# Patient Record
Sex: FEMALE | Race: WHITE | NOT HISPANIC OR LATINO | Employment: FULL TIME | URBAN - METROPOLITAN AREA
[De-identification: names, ages, dates, MRNs, and addresses within clinical notes are randomized per-mention and may not be internally consistent; named-entity substitution may affect disease eponyms.]

---

## 2024-03-22 ENCOUNTER — OFFICE VISIT (OUTPATIENT)
Dept: URGENT CARE | Facility: CLINIC | Age: 39
End: 2024-03-22
Payer: COMMERCIAL

## 2024-03-22 ENCOUNTER — APPOINTMENT (OUTPATIENT)
Dept: RADIOLOGY | Facility: CLINIC | Age: 39
End: 2024-03-22
Payer: COMMERCIAL

## 2024-03-22 VITALS
BODY MASS INDEX: 32.37 KG/M2 | OXYGEN SATURATION: 99 % | TEMPERATURE: 97.5 F | HEART RATE: 75 BPM | DIASTOLIC BLOOD PRESSURE: 67 MMHG | RESPIRATION RATE: 12 BRPM | WEIGHT: 201.4 LBS | SYSTOLIC BLOOD PRESSURE: 122 MMHG | HEIGHT: 66 IN

## 2024-03-22 DIAGNOSIS — S83.242A ACUTE MEDIAL MENISCUS TEAR OF LEFT KNEE, INITIAL ENCOUNTER: Primary | ICD-10-CM

## 2024-03-22 DIAGNOSIS — S89.92XA INJURY OF LEFT KNEE, INITIAL ENCOUNTER: ICD-10-CM

## 2024-03-22 PROCEDURE — 99213 OFFICE O/P EST LOW 20 MIN: CPT | Performed by: PHYSICIAN ASSISTANT

## 2024-03-22 PROCEDURE — 73564 X-RAY EXAM KNEE 4 OR MORE: CPT

## 2024-03-22 RX ORDER — OMEPRAZOLE 40 MG/1
40 CAPSULE, DELAYED RELEASE ORAL DAILY
COMMUNITY
Start: 2024-01-15 | End: 2024-03-26 | Stop reason: ALTCHOICE

## 2024-03-22 RX ORDER — FLUTICASONE PROPIONATE 50 MCG
2 SPRAY, SUSPENSION (ML) NASAL DAILY
COMMUNITY
Start: 2024-01-15

## 2024-03-22 RX ORDER — CETIRIZINE HYDROCHLORIDE 10 MG/1
10 TABLET ORAL DAILY
COMMUNITY
Start: 2024-01-15 | End: 2024-03-26 | Stop reason: ALTCHOICE

## 2024-03-22 RX ORDER — MULTIVITAMIN
1 CAPSULE ORAL DAILY
COMMUNITY

## 2024-03-22 RX ORDER — OSELTAMIVIR PHOSPHATE 75 MG/1
CAPSULE ORAL
COMMUNITY
Start: 2024-01-15 | End: 2024-03-26 | Stop reason: ALTCHOICE

## 2024-03-22 NOTE — PATIENT INSTRUCTIONS
Follow-up with orthopedics  Motrin and or Tylenol as needed for pain  Wear knee compression sleeve  Follow up with PCP in 3-5 days.  Proceed to  ER if symptoms worsen.    If tests have been performed at [facility], our office will contact you with results if changes need to be made to the care plan discussed with you at the visit.  You can review your full results on [facility] My[facility].    Meniscus Tear   AMBULATORY CARE:   A meniscus tear  is a tear in the cartilage of your knee. The meniscus is a piece of cartilage (strong tissue) between your thighbone and shinbone. The meniscus helps to cushion your knee joint and keep it stable.  Call your local emergency number (911 in the [place]) if:   Your arm or leg feels warm, tender, and painful. It may look swollen and red.      Seek care immediately if:   You cannot move your knee at all.       Call your doctor if:   Your symptoms do not improve with treatment.    You have questions or concerns about your condition or care.    Common symptoms include the following:   A pop or tear when the injury happens    Pain and swelling    Tenderness    Stiffness    Popping, catching, or locking of your knee    Not being able to extend your knee fully    Treatment for a meniscus tear  depends on the type of tear you have. Some types of meniscus tears can heal on their own. You may need any of the following:  NSAIDs , such as ibuprofen, help decrease swelling, pain, and fever. This medicine is available with or without a doctor's order. NSAIDs can cause stomach bleeding or kidney problems in certain people. If you take blood thinner medicine, always ask if NSAIDs are safe for you. Always read the medicine label and follow directions. Do not give these medicines to children younger than 6 months without direction from a healthcare provider.     Rest  your knee. Avoid activities that make the swelling or pain worse. You may need to avoid putting weight on your leg while you have pain. Your  healthcare provider may recommend that you use crutches.    Apply ice  on your knee for 15 to 20 minutes every hour or as directed. Use an ice pack, or put crushed ice in a plastic bag. Cover it with a towel. Ice helps prevent tissue damage and decreases swelling and pain.    Compress  your knee with an elastic bandage, air cast, medical boot, or splint to reduce swelling. Ask your healthcare provider which compression device to use, and how tight it should be.    Elevate  your knee above the level of your heart as often as you can. This will help decrease swelling and pain. Prop your knee on pillows or blankets to keep it elevated comfortably.     Surgery  may be needed if your symptoms do not improve. Your healthcare provider may trim away or repair damaged tissue.    Follow up with your doctor as directed:  Write down your questions so you remember to ask them during your visits.   © Copyright [employer] 2023 Information is for End User's use only and may not be sold, redistributed or otherwise used for commercial purposes.  The above information is an  only. It is not intended as medical advice for individual conditions or treatments. Talk to your doctor, nurse or pharmacist before following any medical regimen to see if it is safe and effective for you.

## 2024-03-22 NOTE — PROGRESS NOTES
[facility]        NAME: Vivi Sethi is a 39 y.o. female  : 1985    MRN: [id number]  DATE: 2024  TIME: 1:58 PM    Assessment and Plan   Acute medial meniscus tear of left knee, initial encounter [S83.242A]  1. Acute medial meniscus tear of left knee, initial encounter  XR knee 4+ vw left injury    Ambulatory Referral to Orthopedic Surgery            Patient Instructions     Follow-up with orthopedics  Motrin and or Tylenol as needed for pain  Wear knee compression sleeve  Follow up with PCP in 3-5 days.  Proceed to  ER if symptoms worsen.    If tests have been performed at [facility], our office will contact you with results if changes need to be made to the care plan discussed with you at the visit.  You can review your full results on [facility].    Chief Complaint     Chief Complaint   Patient presents with    Knee Injury     Left knee pain after twisting knee and hearing a pop yesterday. Pain worsened this am.         History of Present Illness       39-year-old female presents with left knee injury.  Patient states she was at soccer practice and went to go after a ball and pivoted on her left knee to get the ball and felt a pop in pain sensation in her left medial knee area.  Since then knees become more painful and feels swollen today.  Denies any numbness or tingling in leg.  No previous injuries of the knee.    Knee Pain   The incident occurred 12 to 24 hours ago. [place] The injury mechanism was a twisting injury. The pain is present in the left knee. The quality of the pain is described as aching. The pain is mild. The pain has been Intermittent since onset. Associated symptoms include a loss of motion. Pertinent negatives include no inability to bear weight, loss of sensation, muscle weakness, numbness or tingling. She reports no foreign bodies present. The symptoms are aggravated by movement and palpation. She has tried rest, ice and  "immobilization for the symptoms. The treatment provided no relief.       Review of Systems   Review of Systems   Constitutional: Negative.    Respiratory: Negative.     Cardiovascular: Negative.    Gastrointestinal: Negative.    Musculoskeletal:  Positive for arthralgias and joint swelling.   Skin: Negative.    Neurological: Negative.  Negative for tingling and numbness.         Current Medications       Current Outpatient Medications:     Multiple Vitamin (multivitamin) capsule, Take 1 capsule by mouth daily, Disp: , Rfl:     Current Allergies     Allergies as of 03/22/2024    (No Known Allergies)            The following portions of the patient's history were reviewed and updated as appropriate: allergies, current medications, past family history, past medical history, past social history, past surgical history and problem list.     History reviewed. No pertinent past medical history.    Past Surgical History:   Procedure Laterality Date    OTHER SURGICAL HISTORY      egg retreival       Family History   Problem Relation Age of Onset    Cancer Mother     Hypertension Father     Hyperlipidemia Father          Medications have been verified.        Objective   /67   Pulse 75   Temp 97.5 °F (36.4 °C)   Resp 12   Ht 5' 6\" (1.676 m)   Wt 91.4 kg (201 lb 6.4 oz)   LMP 03/11/2024   SpO2 99%   BMI 32.51 kg/m²   Patient's last menstrual period was 03/11/2024.       Physical Exam     Physical Exam  Vitals and nursing note reviewed.   Constitutional:       General: She is not in acute distress.     Appearance: She is well-developed.   HENT:      Head: Normocephalic and atraumatic.      Right Ear: External ear normal.      Left Ear: External ear normal.      Nose: Nose normal.      Mouth/Throat:      Mouth: Mucous membranes are moist.   Eyes:      General:         Right eye: No discharge.         Left eye: No discharge.      Conjunctiva/sclera: Conjunctivae normal.   Pulmonary:      Effort: Pulmonary effort is " normal. No respiratory distress.   Musculoskeletal:      Cervical back: Normal range of motion and neck supple.      Left knee: Swelling and bony tenderness present. No deformity, effusion, erythema, ecchymosis, lacerations or crepitus. Decreased range of motion. Tenderness present over the medial joint line. No LCL laxity, MCL laxity, ACL laxity or PCL laxity.Abnormal meniscus. Normal alignment and normal patellar mobility.      Instability Tests: Anterior drawer test negative. Posterior drawer test negative. Anterior Lachman test negative. Medial Vasyl test positive. Lateral Vasyl test negative.   Skin:     General: Skin is warm and dry.   Neurological:      Mental Status: She is alert and oriented to person, place, and time.         Left knee x-ray: No fractures or abnormalities noted.  Pending radiologist interpretation.  Images reviewed independently myself.      MDM: Most likely meniscal tear to the medial aspect.  Recommended neoprene sleeve for comfort and support.  Will have follow-up with orthopedics

## 2024-03-25 ENCOUNTER — OFFICE VISIT (OUTPATIENT)
Dept: OBGYN CLINIC | Facility: CLINIC | Age: 39
End: 2024-03-25

## 2024-03-25 VITALS
HEART RATE: 74 BPM | BODY MASS INDEX: 33.07 KG/M2 | DIASTOLIC BLOOD PRESSURE: 70 MMHG | WEIGHT: 205.8 LBS | TEMPERATURE: 97.9 F | HEIGHT: 66 IN | SYSTOLIC BLOOD PRESSURE: 110 MMHG

## 2024-03-25 DIAGNOSIS — M25.562 ACUTE PAIN OF LEFT KNEE: Primary | ICD-10-CM

## 2024-03-25 PROCEDURE — 99204 OFFICE O/P NEW MOD 45 MIN: CPT | Performed by: ORTHOPAEDIC SURGERY

## 2024-03-25 NOTE — PROGRESS NOTES
ASSESSMENT/PLAN:    Diagnoses and all orders for this visit:    Acute pain of left knee  -     Ambulatory Referral to Orthopedic Surgery        Plan: Treatment was discussed.  At this time, she would like to see if symptoms continue to improve spontaneously.  I did discuss physical therapy but she does not feel the need to attend therapy at this time.  I will see her in 7 to 10 days for reevaluation.  If there has not been a further improvement in symptoms, advanced diagnostic imaging may be warranted.  However, she was reassured that today's evaluation was not strongly indicative of any significant intra-articular pathology.    Return for 7 to 10 days.      _____________________________________________________  CHIEF COMPLAINT:  Chief Complaint   Patient presents with    Left Knee - Pain         SUBJECTIVE:  Vivi Sethi is a 39 y.o. year old female who presents for evaluation of her left knee.  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  She describes a valgus/external rotation injury to the left knee.  She felt a popping sensation, her knee was slightly flexed and she was able to extend the knee and then continue to assist with coaching although she did tend to take it easy.  She denies any swelling.  By the following morning, with persistent pain and slightly worsening pain, she elected to be seen and was seen at the [facility] in [place].  X-rays were obtained and she was referred for orthopedic evaluation and treatment.  She notes some improvement over the last few days but continues to complain of primarily medial pain.  She continues to deny any significant swelling.  She has noted improvement in range of motion but does feel as if the knee is tight especially when she tries to flex the knee.  Denies history of prior injuries.  She has noted a sensation of instability when she initially places weight on the left lower extremity.  She states that she can bear weight on the left lower extremity without pain but if she  "tries to move forward with weightbearing on the left lower extremity she does experience some mild pain.    PAST MEDICAL HISTORY:  History reviewed. No pertinent past medical history.    PAST SURGICAL HISTORY:  Past Surgical History:   Procedure Laterality Date    OTHER SURGICAL HISTORY      egg retreival       FAMILY HISTORY:  Family History   Problem Relation Age of Onset    Cancer Mother     Hypertension Father     Hyperlipidemia Father        SOCIAL HISTORY:  Social History     Tobacco Use    Smoking status: Never    Smokeless tobacco: Never   Vaping Use    Vaping status: Never Used   Substance Use Topics    Alcohol use: Not Currently    Drug use: Never       MEDICATIONS:    Current Outpatient Medications:     Multiple Vitamin (multivitamin) capsule, Take 1 capsule by mouth daily, Disp: , Rfl:     cetirizine (ZyrTEC Allergy) 10 mg tablet, Take 10 mg by mouth daily, Disp: , Rfl:     fluticasone (FLONASE) 50 mcg/act nasal spray, 2 sprays into each nostril daily, Disp: , Rfl:     omeprazole (PriLOSEC) 40 MG capsule, Take 40 mg by mouth daily, Disp: , Rfl:     oseltamivir (TAMIFLU) 75 mg capsule, , Disp: , Rfl:     ALLERGIES:  No Known Allergies    Review of systems:   Constitutional: Negative for fatigue, fever or loss of apetite.   HENT: Negative.    Respiratory: Negative for shortness of breath, dyspnea.    Cardiovascular: Negative for chest pain/tightness.   Gastrointestinal: Negative for abdominal pain, N/V.   Endocrine: Negative for cold/heat intolerance, unexplained weight loss/gain.   Genitourinary: Negative for flank pain, dysuria, hematuria.   Musculoskeletal: Positive as in the HPI  Skin: Negative for rash.    Neurological: Negative  Psychiatric/Behavioral: Negative for agitation.  _____________________________________________________  PHYSICAL EXAMINATION:    Blood pressure 110/70, pulse 74, temperature 97.9 °F (36.6 °C), temperature source Temporal, height 5' 6\" (1.676 m), weight 93.4 kg (205 lb 12.8 " oz), last menstrual period 03/11/2024.    General: well developed and well nourished, alert, oriented times 3, and appears comfortable  Psychiatric: Normal  HEENT: Benign  Cardiovascular: Regular    Pulmonary: No wheezing or stridor  Abdomen: Soft, Nontender  Skin: No masses, erythema, lacerations, fluctation, ulcerations  Neurovascular: Motor and sensory exams are intact and pulses palpable.    MUSCULOSKELETAL EXAMINATION:    The left knee exam demonstrates minimal, if any, effusion.  There is an ecchymoses.  There is no deformity.  She has full extension of the knee and can flex to about 110 degrees with complaints of tightness.  I was able to obtain an additional 10 degrees or so of flexion without significant pain, once again with complaints of tightness.  Collateral and cruciate ligaments are grossly stable.  She does complain of some tenderness with palpation of the lateral joint line and femoral condyle.  She denied tenderness over the medial joint line or femoral condyle.  The tibial plateau was nontender bilaterally.  The patella, patella tendon and tibial tubercle were nontender.  She has negative Vasyl's.  Apley's compression and distraction test are negative.  Thessaly test elicited no complaints of pain although she did feel that the knee was tight.  The remainder of the lower extremity examination bilaterally is benign.      _____________________________________________________  STUDIES REVIEWED:  X-rays of the knee demonstrated no evidence of bony injury.    The x-ray report was reviewed.    The [facility] note was reviewed.        Alfredo Dye

## 2024-04-01 ENCOUNTER — OFFICE VISIT (OUTPATIENT)
Dept: OBGYN CLINIC | Facility: CLINIC | Age: 39
End: 2024-04-01
Payer: COMMERCIAL

## 2024-04-01 VITALS
HEART RATE: 93 BPM | OXYGEN SATURATION: 98 % | DIASTOLIC BLOOD PRESSURE: 80 MMHG | BODY MASS INDEX: 33.43 KG/M2 | SYSTOLIC BLOOD PRESSURE: 120 MMHG | WEIGHT: 208 LBS | TEMPERATURE: 97.6 F | HEIGHT: 66 IN

## 2024-04-01 DIAGNOSIS — M25.562 ACUTE PAIN OF LEFT KNEE: Primary | ICD-10-CM

## 2024-04-01 DIAGNOSIS — M25.462 EFFUSION OF LEFT KNEE: ICD-10-CM

## 2024-04-01 PROCEDURE — 99213 OFFICE O/P EST LOW 20 MIN: CPT | Performed by: ORTHOPAEDIC SURGERY

## 2024-04-01 NOTE — PROGRESS NOTES
ASSESSMENT/PLAN:    Diagnoses and all orders for this visit:    Acute pain of left knee  -     MRI knee left  wo contrast; Future  -     Ambulatory Referral to Physical Therapy; Future    Effusion of left knee    Due to the patient's continued discomfort and effusion we will proceed with MRI evaluation of the left knee and see the patient back in 1 to 2 weeks with MRI results.  May start in formal physical therapy.  He may elect to use her home brace if she feels this is some benefit.  She should continue to use ice and elevation for effusion and swelling control and over-the-counter pain medicine if needed.  Activity as tolerated.  No work noted per patient.    Return 1 to 2 weeks with MRI results, for Recheck.  _____________________________________________________  CHIEF COMPLAINT:  Chief Complaint   Patient presents with    Left Knee - Follow-up    Follow-up     SUBJECTIVE:  Vivi Sethi is a 39 y.o. 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  Has not started any therapy.  Notes the knee feels slightly better still has discomfort, swelling, feelings of instability about the left knee.  She denies gross locking.  Notes that she moves in a slightly gingerly fashion is now able to slowly alternate legs when ascending and descending stairs.    PAST MEDICAL HISTORY:  History reviewed. No pertinent past medical history.    PAST SURGICAL HISTORY:  Past Surgical History:   Procedure Laterality Date    OTHER SURGICAL HISTORY      egg retreival     FAMILY HISTORY:  Family History   Problem Relation Age of Onset    Cancer Mother     Hypertension Father     Hyperlipidemia Father      SOCIAL HISTORY:  Social History     Tobacco Use    Smoking status: Never    Smokeless tobacco: Never   Vaping Use    Vaping status: Never Used   Substance Use Topics    Alcohol use: Not Currently    Drug use: Never     MEDICATIONS:    Current Outpatient Medications:      Multiple Vitamin (multivitamin) capsule, Take 1 capsule by mouth daily, Disp: , Rfl:     fluticasone (FLONASE) 50 mcg/act nasal spray, 2 sprays into each nostril daily (Patient not taking: Reported on 4/1/2024), Disp: , Rfl:     ALLERGIES:  No Known Allergies    REVIEW OF SYSTEMS:  Pertinent items are noted in HPI.  A comprehensive review of systems was negative.  _____________________________________________________  PHYSICAL EXAMINATION:  General: well developed and well nourished, alert, and oriented times 3  Psychiatric: Normal  HEENT:  Normocephalic, atraumatic  Cardiovascular:  Regular  Pulmonary: No wheezing or stridor  Skin: No masses, erthema, lacerations, fluctation, ulcerations  Neurovascular: Grossly intact  MUSCULOSKELETAL EXAMINATION:    Left knee presents with trace to moderate effusion.  No erythema no warmth no ecchymosis.  She is unable to get the knee fully extended while supine on the table.  She has medial joint line tenderness is not present on the contralateral leg.  No lateral joint line tenderness.  Range of motion is 5 to 120 3's.  It shows apprehension with Vasyl's testing notes discomfort but no clicking.  This is slightly limited due to the fact that she cannot fully flex the knee.  No gross ligamentous laxity.  Passively test elicits no discomfort but patient has apprehension is noted to not taped as far she does with the contralateral knee.  Touch sensation is intact.  _____________________________________________________  STUDIES REVIEWED:  No new studies to review    PROCEDURES PERFORMED:  None today    Jonathan Sandhu PA-C

## 2024-04-01 NOTE — PATIENT INSTRUCTIONS
Due to the patient's continued discomfort and effusion we will proceed with MRI evaluation of the left knee and see the patient back in 1 to 2 weeks with MRI results.  May start in formal physical therapy.  He may elect to use her home brace if she feels this is some benefit.  She should continue to use ice and elevation for effusion and swelling control and over-the-counter pain medicine if needed.  Activity as tolerated.  No work noted per patient.

## 2024-04-10 ENCOUNTER — HOSPITAL ENCOUNTER (OUTPATIENT)
Dept: MRI IMAGING | Facility: HOSPITAL | Age: 39
Discharge: HOME | End: 2024-04-10
Payer: COMMERCIAL

## 2024-04-10 DIAGNOSIS — M25.562 ACUTE PAIN OF LEFT KNEE: ICD-10-CM

## 2024-04-10 PROCEDURE — 73721 MRI JNT OF LWR EXTRE W/O DYE: CPT

## 2024-04-22 ENCOUNTER — OFFICE VISIT (OUTPATIENT)
Dept: OBGYN CLINIC | Facility: CLINIC | Age: 39
End: 2024-04-22
Payer: COMMERCIAL

## 2024-04-22 VITALS
HEART RATE: 82 BPM | WEIGHT: 208 LBS | DIASTOLIC BLOOD PRESSURE: 83 MMHG | TEMPERATURE: 97.9 F | SYSTOLIC BLOOD PRESSURE: 123 MMHG | HEIGHT: 66 IN | BODY MASS INDEX: 33.43 KG/M2

## 2024-04-22 DIAGNOSIS — M25.562 ACUTE PAIN OF LEFT KNEE: Primary | ICD-10-CM

## 2024-04-22 DIAGNOSIS — S83.512D TEARS OF MENISCUS AND ACL OF LEFT KNEE, SUBSEQUENT ENCOUNTER: ICD-10-CM

## 2024-04-22 DIAGNOSIS — S83.207D TEARS OF MENISCUS AND ACL OF LEFT KNEE, SUBSEQUENT ENCOUNTER: ICD-10-CM

## 2024-04-22 PROCEDURE — 99214 OFFICE O/P EST MOD 30 MIN: CPT | Performed by: ORTHOPAEDIC SURGERY

## 2024-04-22 NOTE — PROGRESS NOTES
ASSESSMENT/PLAN:    Diagnoses and all orders for this visit:    Acute pain of left knee    Tears of meniscus and ACL of left knee, subsequent encounter    Will get the patient started in physical therapy for ACL rehab and to work on gaining her range of motion of the knee.  It was placed in epic for an ACL brace.  Patient will follow-up in the orthopedic office in 6 weeks for repeat evaluation.  Activity as tolerated.  Ice and elevate for pain or swelling.  Over-the-counter pain meds as needed.    The option of surgery was discussed.  She would prefer to avoid surgery if at all possible and it is not essential.  She does understand that the meniscal tear will not heal.  If she continues to demonstrate findings which are consistent with pain from a medial meniscal tear but does not demonstrate instability after therapy, arthroscopic surgery for the meniscal tear may be a reasonable option.  If she does continue to demonstrate instability despite physical therapy then ACL reconstruction may be necessary.    Return in about 6 weeks (around 6/3/2024) for Recheck.  _____________________________________________________  CHIEF COMPLAINT:  Chief Complaint   Patient presents with    Left Knee - Follow-up    Follow-up     SUBJECTIVE:  Vivi Sethi is a 39 y.o. year old female who presents for follow up of left knee pain and swelling and review of MRI results.  Patient reports remote history of injury in 2003 without any x-rays taken but she was told that she possibly strained her ACL at that point in time.  She did not experience any significant stability or laxity over the past 21 years.  Since her more recent injury that occurred on an abrupt turn and pushed to the right she did hear a pop he notes that wearing a neoprene brace does give her support.  She has not experienced any significant laxity however she notes that she is walking more gingerly and more carefully.  She reports she is she can still not get full extension.   Again she denies any significant major swelling.  Does have some pain in the knee that continues.  He is still being able to  soccer but wearing her brace and it is more subdued and cautious with her movement.  Is unsure whether she would want any surgery as now as she has talked to friends that did not have surgery and 1 regrets not having it and other friends that did have surgical reconstruction.    PAST MEDICAL HISTORY:  History reviewed. No pertinent past medical history.    PAST SURGICAL HISTORY:  Past Surgical History:   Procedure Laterality Date    OTHER SURGICAL HISTORY      egg retreival     FAMILY HISTORY:  Family History   Problem Relation Age of Onset    Cancer Mother     Hypertension Father     Hyperlipidemia Father      SOCIAL HISTORY:  Social History     Tobacco Use    Smoking status: Never    Smokeless tobacco: Never   Vaping Use    Vaping status: Never Used   Substance Use Topics    Alcohol use: Not Currently    Drug use: Never     MEDICATIONS:    Current Outpatient Medications:     Multiple Vitamin (multivitamin) capsule, Take 1 capsule by mouth daily, Disp: , Rfl:     fluticasone (FLONASE) 50 mcg/act nasal spray, 2 sprays into each nostril daily (Patient not taking: Reported on 4/1/2024), Disp: , Rfl:     ALLERGIES:  No Known Allergies    REVIEW OF SYSTEMS:  Pertinent items are noted in HPI.  A comprehensive review of systems was negative.  _____________________________________________________  PHYSICAL EXAMINATION:  General: well developed and well nourished, alert, oriented times 3, and appears comfortable  Psychiatric: Normal  HEENT:  Normocephalic, atraumatic  Cardiovascular:  Regular  Pulmonary: No wheezing or stridor  Skin: No masses, erthema, lacerations, fluctation, ulcerations  Neurovascular: Grossly intact.  MUSCULOSKELETAL EXAMINATION:    Left knee is without any gross intra-articular effusion today.  The seated position anterior drawer very similar translation to the  contralateral side with a slightly firm endpoint but these are very close.  He has a negative posterior drawer.  In the supine position lacks the last 5 degrees of extension and has pain with passive extension past this point but I was able to get her within just a degree or 2 of full extension.  Her flexion is to approximately 115 degrees.  After reassurance, anterior drawer and Lachman testing demonstrates 1-2+ a laxity of the left knee compared to the contralateral right side.  She denies any tenderness to palpation of the medial joint line and had a negative Vasyl's.  She has no tenderness over the femoral condyles or tibial plateau.  Light touch sensation is grossly intact.  Patella tracks midline.  She does have some medial joint line tenderness but no lateral joint line tenderness.  _____________________________________________________  STUDIES REVIEWED:  Left knee MRI dated 4/10/2024 notes  midsubstance tear of the ACL, medial meniscus posterior horn tear and bone contusion pattern consistent with a pivot-shift injury mechanism.    The MRI report was reviewed.    Alfredo Dye

## 2024-04-22 NOTE — PATIENT INSTRUCTIONS
Will get the patient started in physical therapy for ACL rehab and to work on gaining her range of motion of the knee.  It was placed in epic for an ACL brace.  Patient will follow-up in the orthopedic office in 6 weeks for repeat evaluation.  Activity as tolerated.  Ice and elevate for pain or swelling.  Over-the-counter pain meds as needed.

## 2024-05-08 ENCOUNTER — DOCUMENTATION (OUTPATIENT)
Dept: OBGYN CLINIC | Facility: CLINIC | Age: 39
End: 2024-05-08

## 2024-05-13 ENCOUNTER — DOCUMENTATION (OUTPATIENT)
Dept: OBGYN CLINIC | Facility: CLINIC | Age: 39
End: 2024-05-13

## 2024-05-13 NOTE — PROGRESS NOTES
5/13 I reached pt.  She has physical therapy twice a week and is doing well.  She will wait for her follow up with Dr. Dye regarding the ACL brace.

## 2024-06-03 ENCOUNTER — OFFICE VISIT (OUTPATIENT)
Dept: OBGYN CLINIC | Facility: CLINIC | Age: 39
End: 2024-06-03
Payer: COMMERCIAL

## 2024-06-03 VITALS
DIASTOLIC BLOOD PRESSURE: 80 MMHG | WEIGHT: 208 LBS | OXYGEN SATURATION: 98 % | SYSTOLIC BLOOD PRESSURE: 130 MMHG | BODY MASS INDEX: 33.43 KG/M2 | HEIGHT: 66 IN | TEMPERATURE: 97.7 F | HEART RATE: 85 BPM

## 2024-06-03 DIAGNOSIS — S83.207D TEARS OF MENISCUS AND ACL OF LEFT KNEE, SUBSEQUENT ENCOUNTER: Primary | ICD-10-CM

## 2024-06-03 DIAGNOSIS — S83.512D TEARS OF MENISCUS AND ACL OF LEFT KNEE, SUBSEQUENT ENCOUNTER: Primary | ICD-10-CM

## 2024-06-03 PROCEDURE — 99213 OFFICE O/P EST LOW 20 MIN: CPT | Performed by: ORTHOPAEDIC SURGERY

## 2024-06-03 NOTE — PROGRESS NOTES
ASSESSMENT/PLAN:    Diagnoses and all orders for this visit:    Tears of meniscus and ACL of left knee, subsequent encounter        Plan: Treatment was again discussed.  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  At this time, she has experienced enough relief that she would prefer not to proceed with surgical treatment.  She will continue therapy another few weeks.  She has not yet obtained her functional brace as physical therapy thought it best to hold off on obtaining the functional brace.  I would recommend she get the functional brace.  I will see her in 4 to 6 weeks for reevaluation.  She is to contact the office if questions or concerns arise in the interim.    Return for 4 to 6 weeks.      _____________________________________________________  CHIEF COMPLAINT:  Chief Complaint   Patient presents with    Follow-up         SUBJECTIVE:  Vivi Sethi is a 39 y.o. year old female who presents for follow up of her left knee symptoms.  She has noted further improvement with physical therapy.  Her primary difficulty is obtaining endrange extension and flexion.  She does note a feeling of weakness in the leg noting the quad tends to fasciculate with leg extension exercises.  She denies instability.  She does experience some medial knee pain when trying to sit in a figure-of-four position.  She notes a popping sensation which occurs over the anteromedial aspect of her knee on an occasional basis but does not necessarily cause pain.      PAST MEDICAL HISTORY:  History reviewed. No pertinent past medical history.    PAST SURGICAL HISTORY:  Past Surgical History:   Procedure Laterality Date    OTHER SURGICAL HISTORY      egg retreival       FAMILY HISTORY:  Family History   Problem Relation Age of Onset    Cancer Mother     Hypertension  Father     Hyperlipidemia Father        SOCIAL HISTORY:  Social History     Tobacco Use    Smoking status: Never    Smokeless tobacco: Never   Vaping Use    Vaping status: Never Used   Substance Use Topics    Alcohol use: Not Currently    Drug use: Never       MEDICATIONS:    Current Outpatient Medications:     Multiple Vitamin (multivitamin) capsule, Take 1 capsule by mouth daily, Disp: , Rfl:     fluticasone (FLONASE) 50 mcg/act nasal spray, 2 sprays into each nostril daily (Patient not taking: Reported on 4/1/2024), Disp: , Rfl:     ALLERGIES:  No Known Allergies    REVIEW OF SYSTEMS:  Pertinent items are noted in HPI.  A comprehensive review of systems was negative.      _____________________________________________________  PHYSICAL EXAMINATION:  General: well developed and well nourished, alert, and appears comfortable  Psychiatric: Normal  HEENT:  Normocephalic, atraumatic  Cardiovascular:  Regular  Pulmonary: No wheezing or stridor  Skin: No masses, erthema, lacerations, fluctation, ulcerations  Neurovascular: Motor and sensory exams are intact and pulses are palpable.    MUSCULOSKELETAL EXAMINATION:    The left knee exam demonstrates a lack of a couple degrees of terminal extension.  She has flexion to about 120 degrees, perhaps slightly more.  She notes mild tightness with endrange flexion and extension.  She has no significant laxity with anterior draw.  And Lachman testing there is slight laxity of the left compared to the contralateral right but no complaints with the Lachman test and a solid endpoint.  Vasyl's test is negative.  She has no joint line tenderness.  Apley's compression and distraction are negative.    _____________________________________________________  STUDIES REVIEWED:  Physical therapy notes were reviewed.            Alfredo Dye

## 2024-07-15 ENCOUNTER — OFFICE VISIT (OUTPATIENT)
Dept: OBGYN CLINIC | Facility: CLINIC | Age: 39
End: 2024-07-15
Payer: COMMERCIAL

## 2024-07-15 VITALS
WEIGHT: 208 LBS | BODY MASS INDEX: 33.43 KG/M2 | OXYGEN SATURATION: 98 % | TEMPERATURE: 97.4 F | HEART RATE: 76 BPM | DIASTOLIC BLOOD PRESSURE: 78 MMHG | SYSTOLIC BLOOD PRESSURE: 122 MMHG | HEIGHT: 66 IN

## 2024-07-15 DIAGNOSIS — S83.512D TEARS OF MENISCUS AND ACL OF LEFT KNEE, SUBSEQUENT ENCOUNTER: Primary | ICD-10-CM

## 2024-07-15 DIAGNOSIS — S83.207D TEARS OF MENISCUS AND ACL OF LEFT KNEE, SUBSEQUENT ENCOUNTER: Primary | ICD-10-CM

## 2024-07-15 PROCEDURE — 99213 OFFICE O/P EST LOW 20 MIN: CPT | Performed by: ORTHOPAEDIC SURGERY

## 2024-07-15 NOTE — PROGRESS NOTES
ASSESSMENT/PLAN:    Diagnoses and all orders for this visit:    Tears of meniscus and ACL of left knee, subsequent encounter    Reviewed physical exam with patient at time of visit.   Discussed treatment options including conservative vs types of surgical interventions. Educated patient of why she may still experience extension lag and instability. 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 She will follow up as needed.  Surgery to perform meniscectomy/meniscal repair or ACL reconstruction would be indicated if she demonstrates instability with return to athletic activity.  However, she is inclined to avoid surgery unless this becomes necessary.    Return if symptoms worsen or fail to improve.      _____________________________________________________  CHIEF COMPLAINT:  Chief Complaint   Patient presents with    Follow-up         SUBJECTIVE:  Vivi Sethi is a 39 y.o. year old female who presents for follow up of left knee meniscus and ACL tear. Patient states her knee is doing well and pain is well controlled. She has been discharged from physical therapy due to reaching maximal benefit. However, she states her knee has not return to 100%. She states she has no feelings of instability but has not yet returned to sports. Patient states she was nervous at beach last week so she was careful with walking in sand and ocean. She reports that she cannot fully extend her knee. She has a functional brace which she intends to wear for soccer camp next week.     PAST MEDICAL HISTORY:  History reviewed. No pertinent past medical history.    PAST SURGICAL HISTORY:  Past Surgical History:   Procedure Laterality Date    OTHER SURGICAL HISTORY      egg retreival       FAMILY HISTORY:  Family History   Problem Relation Age of Onset    Cancer Mother     Hypertension Father     Hyperlipidemia Father        SOCIAL HISTORY:  Social History      Tobacco Use    Smoking status: Never    Smokeless tobacco: Never   Vaping Use    Vaping status: Never Used   Substance Use Topics    Alcohol use: Not Currently    Drug use: Never       MEDICATIONS:    Current Outpatient Medications:     Multiple Vitamin (multivitamin) capsule, Take 1 capsule by mouth daily, Disp: , Rfl:     fluticasone (FLONASE) 50 mcg/act nasal spray, 2 sprays into each nostril daily (Patient not taking: Reported on 4/1/2024), Disp: , Rfl:     ALLERGIES:  No Known Allergies    REVIEW OF SYSTEMS:  Pertinent items are noted in HPI.  A comprehensive review of systems was negative.      _____________________________________________________  PHYSICAL EXAMINATION:  General: well developed and well nourished, alert, oriented times 3, and appears comfortable  Psychiatric: Normal  HEENT:  Normocephalic, atraumatic  Cardiovascular:  Regular  Pulmonary: No wheezing or stridor  Skin: No masses, erthema, lacerations, fluctation, ulcerations  Neurovascular: Sensory and Motor grossly intact     MUSCULOSKELETAL EXAMINATION:    left knee(s) -   Patient ambulates with steady gait pattern  Uses No assistive device  No anatomical deformity  Skin is warm and dry to touch with no signs of erythema, ecchymosis, or infection   No soft tissue swelling or effusion noted  ROM (2-3° - 120°)   Strength: deferred  Flexor and extensor mechanisms are intact   Knee is  stable to varus and valgus stress  1A Lachman's  1A Anterior Drawer  Patella tracks centrally without palpable crepitus  Calf compartments are soft and supple  - Martha's sign  2+ DP and PT pulses with brisk capillary refill to the toes  Sural, saphenous, tibial, superficial, and deep peroneal motor and sensory distributions intact  Sensation light touch intact distally          Scribe Attestation      I,:  Nita Aden am acting as a scribe while in the presence of the attending physician.:       I,:  Alfredo Dye personally performed the services described  in this documentation    as scribed in my presence.: